# Patient Record
Sex: FEMALE | Race: ASIAN | NOT HISPANIC OR LATINO | URBAN - METROPOLITAN AREA
[De-identification: names, ages, dates, MRNs, and addresses within clinical notes are randomized per-mention and may not be internally consistent; named-entity substitution may affect disease eponyms.]

---

## 2018-07-18 ENCOUNTER — EMERGENCY (EMERGENCY)
Facility: HOSPITAL | Age: 18
LOS: 1 days | Discharge: HOME | End: 2018-07-18
Attending: EMERGENCY MEDICINE
Payer: COMMERCIAL

## 2018-07-18 VITALS
DIASTOLIC BLOOD PRESSURE: 74 MMHG | OXYGEN SATURATION: 98 % | SYSTOLIC BLOOD PRESSURE: 114 MMHG | TEMPERATURE: 98 F | RESPIRATION RATE: 18 BRPM | HEART RATE: 79 BPM

## 2018-07-18 PROCEDURE — 99283 EMERGENCY DEPT VISIT LOW MDM: CPT

## 2018-07-18 PROCEDURE — 99284 EMERGENCY DEPT VISIT MOD MDM: CPT

## 2018-07-18 RX ORDER — IBUPROFEN 200 MG
600 TABLET ORAL ONCE
Qty: 0 | Refills: 0 | Status: COMPLETED | OUTPATIENT
Start: 2018-07-18 | End: 2018-07-18

## 2018-07-18 RX ADMIN — Medication 600 MILLIGRAM(S): at 20:16
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
## 2018-07-18 NOTE — ED PROVIDER NOTE - PLAN OF CARE
-- Please use 650mg Tylenol (also called acetaminophen) every 4 hours & 600mg Motrin (also called Advil or ibuprofen) every 6 hours as needed for pain/discomfort/swelling. You can get these without a prescription. Don't use more than 3500mg of Tylenol in any 24-hour period. Make sure your other prescription/over-the-counter medications don't contain any Tylenol so you don't take too much. If you have any stomach discomfort while taking Motrin, you can use TUMS or Pepcid or Zantac (these can all be bought without a prescription).   Rest, increase activity as tolerated.    REturn to the ER  for any concerns   Ice packs to all sore areas as needed for 15-20 min at a time.  REturn to the ER for shortness of breath chest pains dizziness uncontrolled pain numbness or tinging L sided
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
## 2018-07-18 NOTE — ED PROVIDER NOTE - MUSCULOSKELETAL NECK EXAM
STIFF/trachea midline/supple/PAIN ON MOVEMENT PAIN ON MOVEMENT/trachea midline/some TTP at L paracervical and trap musculature; none midline.  No motor or sensory deficits in LUE/supple

## 2018-07-18 NOTE — ED ADULT NURSE NOTE - CHPI ED SYMPTOMS NEG
no decreased eating/drinking/no fussiness/no laceration/no disorientation/no dizziness/no neck tenderness/no crying/no difficulty bearing weight/no sleeping issues/no loss of consciousness/no bruising